# Patient Record
Sex: MALE | Race: WHITE | NOT HISPANIC OR LATINO | URBAN - METROPOLITAN AREA
[De-identification: names, ages, dates, MRNs, and addresses within clinical notes are randomized per-mention and may not be internally consistent; named-entity substitution may affect disease eponyms.]

---

## 2020-07-10 ENCOUNTER — INPATIENT (INPATIENT)
Facility: HOSPITAL | Age: 40
LOS: 0 days | Discharge: HOME | DRG: 394 | End: 2020-07-11
Attending: SURGERY | Admitting: SURGERY
Payer: COMMERCIAL

## 2020-07-10 VITALS
WEIGHT: 250 LBS | RESPIRATION RATE: 18 BRPM | TEMPERATURE: 98 F | SYSTOLIC BLOOD PRESSURE: 153 MMHG | HEIGHT: 69 IN | HEART RATE: 70 BPM | OXYGEN SATURATION: 100 % | DIASTOLIC BLOOD PRESSURE: 74 MMHG

## 2020-07-10 DIAGNOSIS — N20.1 CALCULUS OF URETER: ICD-10-CM

## 2020-07-10 LAB
ALBUMIN SERPL ELPH-MCNC: 4.1 G/DL — SIGNIFICANT CHANGE UP (ref 3.3–5)
ALP SERPL-CCNC: 62 U/L — SIGNIFICANT CHANGE UP (ref 40–120)
ALT FLD-CCNC: 13 U/L — SIGNIFICANT CHANGE UP (ref 10–45)
ANION GAP SERPL CALC-SCNC: 13 MMOL/L — SIGNIFICANT CHANGE UP (ref 5–17)
APPEARANCE UR: ABNORMAL
APTT BLD: 30.1 SEC — SIGNIFICANT CHANGE UP (ref 27.5–35.5)
AST SERPL-CCNC: 15 U/L — SIGNIFICANT CHANGE UP (ref 10–40)
BASOPHILS # BLD AUTO: 0.04 K/UL — SIGNIFICANT CHANGE UP (ref 0–0.2)
BASOPHILS NFR BLD AUTO: 0.2 % — SIGNIFICANT CHANGE UP (ref 0–2)
BILIRUB SERPL-MCNC: 1.1 MG/DL — SIGNIFICANT CHANGE UP (ref 0.2–1.2)
BILIRUB UR-MCNC: NEGATIVE — SIGNIFICANT CHANGE UP
BLD GP AB SCN SERPL QL: NEGATIVE — SIGNIFICANT CHANGE UP
BUN SERPL-MCNC: 16 MG/DL — SIGNIFICANT CHANGE UP (ref 7–23)
CALCIUM SERPL-MCNC: 8.5 MG/DL — SIGNIFICANT CHANGE UP (ref 8.4–10.5)
CHLORIDE SERPL-SCNC: 101 MMOL/L — SIGNIFICANT CHANGE UP (ref 96–108)
CO2 SERPL-SCNC: 27 MMOL/L — SIGNIFICANT CHANGE UP (ref 22–31)
COLOR SPEC: YELLOW — SIGNIFICANT CHANGE UP
CREAT SERPL-MCNC: 1.27 MG/DL — SIGNIFICANT CHANGE UP (ref 0.5–1.3)
DIFF PNL FLD: ABNORMAL
EOSINOPHIL # BLD AUTO: 0 K/UL — SIGNIFICANT CHANGE UP (ref 0–0.5)
EOSINOPHIL NFR BLD AUTO: 0 % — SIGNIFICANT CHANGE UP (ref 0–6)
GLUCOSE SERPL-MCNC: 123 MG/DL — HIGH (ref 70–99)
GLUCOSE UR QL: NEGATIVE — SIGNIFICANT CHANGE UP
HCT VFR BLD CALC: 31.7 % — LOW (ref 39–50)
HGB BLD-MCNC: 10.2 G/DL — LOW (ref 13–17)
IMM GRANULOCYTES NFR BLD AUTO: 0.4 % — SIGNIFICANT CHANGE UP (ref 0–1.5)
INR BLD: 1.09 — SIGNIFICANT CHANGE UP (ref 0.88–1.16)
KETONES UR-MCNC: NEGATIVE — SIGNIFICANT CHANGE UP
LACTATE SERPL-SCNC: 1.1 MMOL/L — SIGNIFICANT CHANGE UP (ref 0.5–2)
LEUKOCYTE ESTERASE UR-ACNC: ABNORMAL
LIDOCAIN IGE QN: 22 U/L — SIGNIFICANT CHANGE UP (ref 7–60)
LYMPHOCYTES # BLD AUTO: 13.6 % — SIGNIFICANT CHANGE UP (ref 13–44)
LYMPHOCYTES # BLD AUTO: 2.28 K/UL — SIGNIFICANT CHANGE UP (ref 1–3.3)
MCHC RBC-ENTMCNC: 28.2 PG — SIGNIFICANT CHANGE UP (ref 27–34)
MCHC RBC-ENTMCNC: 32.2 GM/DL — SIGNIFICANT CHANGE UP (ref 32–36)
MCV RBC AUTO: 87.6 FL — SIGNIFICANT CHANGE UP (ref 80–100)
MONOCYTES # BLD AUTO: 0.88 K/UL — SIGNIFICANT CHANGE UP (ref 0–0.9)
MONOCYTES NFR BLD AUTO: 5.3 % — SIGNIFICANT CHANGE UP (ref 2–14)
NEUTROPHILS # BLD AUTO: 13.47 K/UL — HIGH (ref 1.8–7.4)
NEUTROPHILS NFR BLD AUTO: 80.5 % — HIGH (ref 43–77)
NITRITE UR-MCNC: NEGATIVE — SIGNIFICANT CHANGE UP
NRBC # BLD: 0 /100 WBCS — SIGNIFICANT CHANGE UP (ref 0–0)
PH UR: 6 — SIGNIFICANT CHANGE UP (ref 5–8)
PLATELET # BLD AUTO: 305 K/UL — SIGNIFICANT CHANGE UP (ref 150–400)
POTASSIUM SERPL-MCNC: 3.8 MMOL/L — SIGNIFICANT CHANGE UP (ref 3.5–5.3)
POTASSIUM SERPL-SCNC: 3.8 MMOL/L — SIGNIFICANT CHANGE UP (ref 3.5–5.3)
PROT SERPL-MCNC: 6.4 G/DL — SIGNIFICANT CHANGE UP (ref 6–8.3)
PROT UR-MCNC: 100 MG/DL
PROTHROM AB SERPL-ACNC: 13 SEC — SIGNIFICANT CHANGE UP (ref 10.6–13.6)
RBC # BLD: 3.62 M/UL — LOW (ref 4.2–5.8)
RBC # FLD: 13.5 % — SIGNIFICANT CHANGE UP (ref 10.3–14.5)
RH IG SCN BLD-IMP: POSITIVE — SIGNIFICANT CHANGE UP
SODIUM SERPL-SCNC: 141 MMOL/L — SIGNIFICANT CHANGE UP (ref 135–145)
SP GR SPEC: 1.02 — SIGNIFICANT CHANGE UP (ref 1–1.03)
UROBILINOGEN FLD QL: 0.2 E.U./DL — SIGNIFICANT CHANGE UP
WBC # BLD: 16.74 K/UL — HIGH (ref 3.8–10.5)
WBC # FLD AUTO: 16.74 K/UL — HIGH (ref 3.8–10.5)

## 2020-07-10 PROCEDURE — 74174 CTA ABD&PLVS W/CONTRAST: CPT | Mod: 26,59

## 2020-07-10 PROCEDURE — 74018 RADEX ABDOMEN 1 VIEW: CPT | Mod: 26

## 2020-07-10 PROCEDURE — 99285 EMERGENCY DEPT VISIT HI MDM: CPT

## 2020-07-10 RX ORDER — CIPROFLOXACIN LACTATE 400MG/40ML
400 VIAL (ML) INTRAVENOUS ONCE
Refills: 0 | Status: COMPLETED | OUTPATIENT
Start: 2020-07-10 | End: 2020-07-10

## 2020-07-10 RX ORDER — RADIOPAQUE PVC MARKERS/BARIUM 24MARKERS
30 CAPSULE ORAL ONCE
Refills: 0 | Status: DISCONTINUED | OUTPATIENT
Start: 2020-07-10 | End: 2020-07-11

## 2020-07-10 RX ORDER — ONDANSETRON 8 MG/1
4 TABLET, FILM COATED ORAL ONCE
Refills: 0 | Status: COMPLETED | OUTPATIENT
Start: 2020-07-10 | End: 2020-07-10

## 2020-07-10 RX ORDER — METRONIDAZOLE 500 MG
500 TABLET ORAL ONCE
Refills: 0 | Status: DISCONTINUED | OUTPATIENT
Start: 2020-07-10 | End: 2020-07-10

## 2020-07-10 RX ORDER — HYDROMORPHONE HYDROCHLORIDE 2 MG/ML
1 INJECTION INTRAMUSCULAR; INTRAVENOUS; SUBCUTANEOUS ONCE
Refills: 0 | Status: DISCONTINUED | OUTPATIENT
Start: 2020-07-10 | End: 2020-07-10

## 2020-07-10 RX ORDER — SODIUM CHLORIDE 9 MG/ML
1000 INJECTION INTRAMUSCULAR; INTRAVENOUS; SUBCUTANEOUS ONCE
Refills: 0 | Status: COMPLETED | OUTPATIENT
Start: 2020-07-10 | End: 2020-07-10

## 2020-07-10 RX ADMIN — HYDROMORPHONE HYDROCHLORIDE 1 MILLIGRAM(S): 2 INJECTION INTRAMUSCULAR; INTRAVENOUS; SUBCUTANEOUS at 22:17

## 2020-07-10 RX ADMIN — ONDANSETRON 4 MILLIGRAM(S): 8 TABLET, FILM COATED ORAL at 22:17

## 2020-07-10 RX ADMIN — SODIUM CHLORIDE 1000 MILLILITER(S): 9 INJECTION INTRAMUSCULAR; INTRAVENOUS; SUBCUTANEOUS at 22:18

## 2020-07-10 NOTE — CONSULT NOTE ADULT - SUBJECTIVE AND OBJECTIVE BOX
PRELIM NOTE    40 y.o. M patient     PAST MEDICAL & SURGICAL HISTORY:  Ureteral stone s/p cysto, left JJ stent at OSH in [place]  2020  S/p cysto, URS, stent 2010 at [facility]    ALLERGIES:  PCN      LABS:                        10.2   16.74 )-----------( 305      ( 10 Jul 2020 22:00 )             31.7   07-10    141  |  101  |  16  ----------------------------<  123<H>  3.8   |  27  |  1.27    Ca    8.5      10 Jul 2020 22:00    TPro  6.4  /  Alb  4.1  /  TBili  1.1  /  DBili  x   /  AST  15  /  ALT  13  /  AlkPhos  62  07-10    Urinalysis Basic - ( 10 Jul 2020 22:35 )    Color: Yellow / Appearance: SL Cloudy / S.025 / pH: x  Gluc: x / Ketone: NEGATIVE  / Bili: Negative / Urobili: 0.2 E.U./dL   Blood: x / Protein: 100 mg/dL / Nitrite: NEGATIVE   Leuk Esterase: Trace / RBC: x / WBC x   Sq Epi: x / Non Sq Epi: x / Bacteria: x      IMAGING:  KUB - left JJ stent in adequate position      Vital Signs Last 24 Hrs  T(C): 36.7 (10 Jul 2020 21:37), Max: 36.7 (10 Jul 2020 21:37)  T(F): 98.1 (10 Jul 2020 21:37), Max: 98.1 (10 Jul 2020 21:37)  HR: 70 (10 Jul 2020 21:37) (70 - 70)  BP: 153/74 (10 Jul 2020 21:37) (153/74 - 153/74)  BP(mean): --  RR: 18 (10 Jul 2020 21:37) (18 - 18)  SpO2: 100% (10 Jul 2020 21:37) (100% - 100%)    PHYSICAL EXAM:  Gen:  HENT:  Card:  Resp:  Abd:  :  Ext:  Skin:  Psych: 40 y.o. M patient presenting with ER with complaint of abdominal pain and bright blood per rectum x2 weeks.  Patient states he went to a subdsidiary of [facility] in [place] for this issue, and was subsequently diagnosed with a 8mm left proximal ureteral stent and multiple bilateral kidney stones.  He is now s/p cysto, URS, left stent.  He states stone was not removed.  Patient was unhappy with his care at OSH and signed out AMA, now seeking treatment here.  Denies F/C/N/V.  Denies stent colic.  Denies hematuria.  Has mild dysuria.       history significant for multiple bilateral stones, requiring several surgical interventions over the years (cysto, URS, laser procedure, also ESWL) and some spontaneously voided.  He denies ever having PCNL.  Patient used to see Dr Rojas here at [facility].  He does not remember all the procedures done, the timing or the urologists who performed them all.    PAST MEDICAL & SURGICAL HISTORY:  Ureteral stone s/p cysto,URS, left JJ stent at OSH in [place]  2020 (presumed procedure based on patient description, need records to confirm)  S/p cysto, URS, stent 2010 at [facility] with Dr Rojas  S/P multiple renal stone procedures (cysto/stent and ESWLs) - patient does not remember timing or urologist who performed these  Hemorrhoids s/p banding    ALLERGIES:  PCN      LABS:                        10.2   16.74 )-----------( 305      ( 10 Jul 2020 22:00 )             31.7   07-10    141  |  101  |  16  ----------------------------<  123<H>  3.8   |  27  |  1.27    Ca    8.5      10 Jul 2020 22:00    TPro  6.4  /  Alb  4.1  /  TBili  1.1  /  DBili  x   /  AST  15  /  ALT  13  /  AlkPhos  62  07-10    Urinalysis Basic - ( 10 Jul 2020 22:35 )    Color: Yellow / Appearance: SL Cloudy / S.025 / pH: x  Gluc: x / Ketone: NEGATIVE  / Bili: Negative / Urobili: 0.2 E.U./dL   Blood: x / Protein: 100 mg/dL / Nitrite: NEGATIVE   Leuk Esterase: Trace / RBC: x / WBC x   Sq Epi: x / Non Sq Epi: x / Bacteria: x      IMAGING:  KUB - left JJ stent in adequate position      Vital Signs Last 24 Hrs  T(C): 36.7 (10 Jul 2020 21:37), Max: 36.7 (10 Jul 2020 21:37)  T(F): 98.1 (10 Jul 2020 21:37), Max: 98.1 (10 Jul 2020 21:37)  HR: 70 (10 Jul 2020 21:37) (70 - 70)  BP: 153/74 (10 Jul 2020 21:37) (153/74 - 153/74)  BP(mean): --  RR: 18 (10 Jul 2020 21:37) (18 - 18)  SpO2: 100% (10 Jul 2020 21:37) (100% - 100%)    PHYSICAL EXAM:  Gen: NAD, resting in bed, conversant  HENT: NC/T, PERRL, neck flat, good ROM  Resp: no stridor or wheezing, good inspiratory effort  Abd: soft, no guarding or rigidity, no suprapubic tenderness  : voiding, mild L CVAT  LUCY: performed by surgical resident  Ext: b/l warm, well perfused  Skin: no obvious rashes, lesions, ulcers  Psych: awake, alert, oriented to person, place, time 40 y.o. M patient presenting with ER with complaint of abdominal pain and bright blood per rectum x2 weeks.  Patient states he went to a subdsidiary of [facility] in [place] for this issue, and was subsequently diagnosed with a 8mm left proximal ureteral stent and multiple bilateral kidney stones.  He is now s/p cysto, URS, left stent.  He states stone was not removed.  Patient was unhappy with his care at OSH and signed out AMA, now seeking treatment here.  Denies F/C/N/V.  Denies stent colic.  Denies hematuria.  Has mild dysuria.       history significant for multiple bilateral stones, requiring several surgical interventions over the years (cysto, URS, laser procedure, also ESWL) and some spontaneously voided.  He denies ever having PCNL.  Patient used to see Dr Rojas here at [facility].  He does not remember all the procedures done, the timing or the urologists who performed them all.    PAST MEDICAL & SURGICAL HISTORY:  Ureteral stone s/p cysto,URS, left JJ stent at OSH in [place]  2020 (presumed procedure based on patient description, need records to confirm)  S/p cysto, URS, stent 2010 at [facility] with Dr Rojas  S/P multiple renal stone procedures (cysto/stent and ESWLs) - patient does not remember timing or urologist who performed these  Hemorrhoids s/p banding    ALLERGIES:  PCN      LABS:                        10.2   16.74 )-----------( 305      ( 10 Jul 2020 22:00 )             31.7   07-10    141  |  101  |  16  ----------------------------<  123<H>  3.8   |  27  |  1.27    Ca    8.5      10 Jul 2020 22:00    TPro  6.4  /  Alb  4.1  /  TBili  1.1  /  DBili  x   /  AST  15  /  ALT  13  /  AlkPhos  62  07-10    Urinalysis Basic - ( 10 Jul 2020 22:35 )    Color: Yellow / Appearance: SL Cloudy / S.025 / pH: x  Gluc: x / Ketone: NEGATIVE  / Bili: Negative / Urobili: 0.2 E.U./dL   Blood: x / Protein: 100 mg/dL / Nitrite: NEGATIVE   Leuk Esterase: Trace / RBC: x / WBC x   Sq Epi: x / Non Sq Epi: x / Bacteria: x      IMAGING:  KUB - left JJ stent in adequate position      Vital Signs Last 24 Hrs  T(C): 36.7 (10 Jul 2020 21:37), Max: 36.7 (10 Jul 2020 21:37)  T(F): 98.1 (10 Jul 2020 21:37), Max: 98.1 (10 Jul 2020 21:37)  HR: 70 (10 Jul 2020 21:37) (70 - 70)  BP: 153/74 (10 Jul 2020 21:37) (153/74 - 153/74)  BP(mean): --  RR: 18 (10 Jul 2020 21:37) (18 - 18)  SpO2: 100% (10 Jul 2020 21:37) (100% - 100%)    PHYSICAL EXAM:  Gen: NAD, resting in bed, conversant  HENT: NC/T, PERRL, neck flat, good ROM  Resp: no stridor or wheezing, good inspiratory effort  Abd: soft, no guarding or rigidity, no suprapubic tenderness  : voiding,  L CVAT  LUCY: performed by surgical resident  Ext: b/l warm, well perfused  Skin: no obvious rashes, lesions, ulcers  Psych: awake, alert, oriented to person, place, time 40 y.o. M patient presenting with ER with complaint of abdominal pain and bright blood per rectum x2 weeks.  Patient states he went to a subdsidiary of [facility] in [place] for this issue, and was subsequently diagnosed with a 8mm left proximal ureteral stent and multiple bilateral kidney stones.  He is now s/p cysto, URS, left stent.  He states stone was not removed.  Patient was unhappy with his care at OSH and signed out AMA, now seeking treatment here.  Denies F/C/N/V.  Denies stent colic.  Denies hematuria.  Has mild dysuria.       history significant for multiple bilateral stones, requiring several surgical interventions over the years (cysto, URS, laser procedure, also ESWL) and some spontaneously voided.  He denies ever having PCNL.  Patient used to see Dr Rojas here at [facility].  He does not remember all the procedures done, the timing or the urologists who performed them all.    PAST MEDICAL & SURGICAL HISTORY:  Ureteral stone s/p cysto,URS, left JJ stent at OSH in [place]  2020 (presumed procedure based on patient description, need records to confirm)  S/p cysto, URS, stent 2010 at [facility] with Dr Rojas  S/P multiple renal stone procedures (cysto/stent and ESWLs) - patient does not remember timing or urologist who performed these  Hemorrhoids s/p banding    ALLERGIES:  PCN      LABS:                        10.2   16.74 )-----------( 305      ( 10 Jul 2020 22:00 )             31.7   07-10    141  |  101  |  16  ----------------------------<  123<H>  3.8   |  27  |  1.27    Ca    8.5      10 Jul 2020 22:00    TPro  6.4  /  Alb  4.1  /  TBili  1.1  /  DBili  x   /  AST  15  /  ALT  13  /  AlkPhos  62  07-10    Urinalysis Basic - ( 10 Jul 2020 22:35 )    Color: Yellow / Appearance: SL Cloudy / S.025 / pH: x  Gluc: x / Ketone: NEGATIVE  / Bili: Negative / Urobili: 0.2 E.U./dL   Blood: x / Protein: 100 mg/dL / Nitrite: NEGATIVE   Leuk Esterase: Trace / RBC: Many /HPF / WBC 5-10 /HPF   Sq Epi: x / Non Sq Epi: 0-5 /HPF / Bacteria: Present /HPF      IMAGING:  KUB - left JJ stent in adequate position      Vital Signs Last 24 Hrs  T(C): 36.7 (10 Jul 2020 21:37), Max: 36.7 (10 Jul 2020 21:37)  T(F): 98.1 (10 Jul 2020 21:37), Max: 98.1 (10 Jul 2020 21:37)  HR: 70 (10 Jul 2020 21:37) (70 - 70)  BP: 153/74 (10 Jul 2020 21:37) (153/74 - 153/74)  BP(mean): --  RR: 18 (10 Jul 2020 21:37) (18 - 18)  SpO2: 100% (10 Jul 2020 21:37) (100% - 100%)    PHYSICAL EXAM:  Gen: NAD, resting in bed, conversant  HENT: NC/T, PERRL, neck flat, good ROM  Resp: no stridor or wheezing, good inspiratory effort  Abd: soft, no guarding or rigidity, no suprapubic tenderness  : voiding,  L CVAT  LUCY: performed by surgical resident  Ext: b/l warm, well perfused  Skin: no obvious rashes, lesions, ulcers  Psych: awake, alert, oriented to person, place, time 40 y.o. M patient presenting with ER with complaint of abdominal pain and bright blood per rectum x2 weeks.  He is s/p hemorrhoid banding 2 weeks ago.  Patient states he went to a subdsidiary of [facility] in [place] for this issue, and was subsequently diagnosed with a 8mm left proximal ureteral stent and multiple bilateral kidney stones.  He is now s/p cysto, URS, left stent.  He states stone was not removed.  Patient was unhappy with his care at OSH and signed out AMA, now seeking treatment here.  Denies F/C/N/V.  Denies stent colic.  Denies hematuria.  Has mild dysuria.       history significant for multiple bilateral stones, requiring several surgical interventions over the years (cysto, URS, laser procedure, also ESWL) and some spontaneously voided.  He denies ever having PCNL.  Patient used to see Dr Rojas here at [facility].  He does not remember all the procedures done, the timing or the urologists who performed them all.    PAST MEDICAL & SURGICAL HISTORY:  -Colonoscopy 7/10/2020  -Hemorrhoids s/p banding 2 week ago  -Ureteral stone s/p cysto,URS, left JJ stent at OSH in [place]  2020 (presumed procedure based on patient description, need records to confirm)  -S/p cysto, URS, stent 2010 at [facility] with Dr Rojas  -S/P multiple renal stone procedures (cysto/stent and ESWLs) - patient does not remember timing or urologist who performed these  -Hypertension  -Hyperlipidemia    ALLERGIES:  PCN      LABS:                        10.2   16.74 )-----------( 305      ( 10 Jul 2020 22:00 )             31.7   07-10    141  |  101  |  16  ----------------------------<  123<H>  3.8   |  27  |  1.27    Ca    8.5      10 Jul 2020 22:00    TPro  6.4  /  Alb  4.1  /  TBili  1.1  /  DBili  x   /  AST  15  /  ALT  13  /  AlkPhos  62  07-10    Urinalysis Basic - ( 10 Jul 2020 22:35 )    Color: Yellow / Appearance: SL Cloudy / S.025 / pH: x  Gluc: x / Ketone: NEGATIVE  / Bili: Negative / Urobili: 0.2 E.U./dL   Blood: x / Protein: 100 mg/dL / Nitrite: NEGATIVE   Leuk Esterase: Trace / RBC: Many /HPF / WBC 5-10 /HPF   Sq Epi: x / Non Sq Epi: 0-5 /HPF / Bacteria: Present /HPF      IMAGING:  KUB - left JJ stent in adequate position      Vital Signs Last 24 Hrs  T(C): 36.7 (10 Jul 2020 21:37), Max: 36.7 (10 Jul 2020 21:37)  T(F): 98.1 (10 Jul 2020 21:37), Max: 98.1 (10 Jul 2020 21:37)  HR: 70 (10 Jul 2020 21:37) (70 - 70)  BP: 153/74 (10 Jul 2020 21:37) (153/74 - 153/74)  BP(mean): --  RR: 18 (10 Jul 2020 21:37) (18 - 18)  SpO2: 100% (10 Jul 2020 21:37) (100% - 100%)    PHYSICAL EXAM:  Gen: NAD, resting in bed, conversant  HENT: NC/T, PERRL, neck flat, good ROM  Resp: no stridor or wheezing, good inspiratory effort  Abd: soft, no guarding or rigidity, no suprapubic tenderness  : voiding,  L CVAT  LUCY: performed by surgical resident  Ext: b/l warm, well perfused  Skin: no obvious rashes, lesions, ulcers  Psych: awake, alert, oriented to person, place, time

## 2020-07-10 NOTE — CONSULT NOTE ADULT - ASSESSMENT
40 y.o. M patient with      KUB showing Left JJ stent to be in adequate position. 40 y.o. M patient with concern for GI bleeding, CT pending.  Also recent history of left 8mm ureteral stone s/p cysto, URS, stent at OSH yesterday.    KUB showing Left JJ stent to be in adequate position.  WBC 16.7  Cre 1.27 40 y.o. M patient with concern for GI bleeding, CT pending.  Also recent history of left 8mm ureteral stone s/p cysto, URS, stent at OSH yesterday.    KUB showing Left JJ stent to be in adequate position.  WBC 16.7,  Cre 1.27, H&H 10.2/31.7,  Ua trace LE, large blood, 5-10wbc, bacteria present.

## 2020-07-10 NOTE — CONSULT NOTE ADULT - PROBLEM SELECTOR RECOMMENDATION 9
-No acute surgical intervention,  will follow  -Please order Urine culture  -F/U Blood culture  -Pain control  -If patient develops stent colic pain(which usually presents as spasms to left flank or pain that radiates up flank when voiding), can give Ditropan 5mg TID prn or Valium 5mg TID prn -Discussed with Dr Lugo, No acute surgical intervention,  will follow  -Please order Urine culture  -F/U Blood culture  -Pain control  -If patient develops stent colic pain(which usually presents as spasms to left flank or pain that radiates up flank when voiding), can give Ditropan 5mg TID prn or Valium 5mg TID prn -Discussed with Dr Lugo, No acute surgical intervention,  will follow  -Please order Urine culture  -F/U Blood culture  -Pain control  -If patient develops stent colic pain(which usually presents as spasms to left flank or pain that radiates up flank when voiding), can give Ditropan 5mg TID prn or Valium 5mg TID prn  -IV fluid hydration  -Pyridium 100mg TID prn for dysuria for max 2 days -Discussed with Dr Lugo, No acute surgical intervention,  will follow  -Please order Urine culture  -F/U Blood culture  -Pain control  -If patient develops stent colic pain(which usually presents as spasms to left flank or pain that radiates up flank when voiding), can give Ditropan 5mg TID prn or Valium 5mg TID prn  -IV fluid hydration  -Pyridium 100mg TID prn for dysuria for max 2 days  -Antibiotic choice will depend on results of the rest of his work up -Discussed with Dr Lugo, No acute surgical intervention,  will follow  -Please order Urine culture  -F/U Blood culture  -Pain control  -If patient develops stent colic pain(which usually presents as spasms to left flank or pain that radiates up flank when voiding), can give Ditropan 5mg TID prn or Valium 5mg TID prn  -IV fluid hydration  -Pyridium 100mg TID prn for dysuria for max 2 days  -Antibiotic - IV Cipro ordered by ER

## 2020-07-10 NOTE — ED PROVIDER NOTE - CLINICAL SUMMARY MEDICAL DECISION MAKING FREE TEXT BOX
39 yo male with  diffuse abdominal pain and rectal bleeding. Symptoms started 2 weeks ago. Pt also mentioned that he had band ligation done for his internal hemorrhoids 2 weeks ago and colonoscopy earlier today. had ureteral stent placed for his left side ureteral stone yesterday. Pt afebrile, but in significant amount of pain on exam. no active  rectal bleeding while in the ER. labs and CT scan done. pt evaluated by urology and surgery team. admission recommended for further management.

## 2020-07-10 NOTE — ED ADULT TRIAGE NOTE - CHIEF COMPLAINT QUOTE
abdominal pain, pt left AMA from [facility] @5pm today was admitted there for 2 days hx of kidney stone, last pain medication Dilaudid @5pm

## 2020-07-10 NOTE — ED PROVIDER NOTE - OBJECTIVE STATEMENT
39 yo male with h/o HLD, HTN, in the ER c/o rectal bleeding x 2 weeks, c/o associated abdominal and lower back/flank pain. Pt mentioned he had band placed to treat his hemorrhoids  2 weeks ago and since that he  has been having RBPR. Pt reports  he fainted 2 days ago after massive bleeding and abdominal pain. pt went to the nearest hospital where he was diagnosed with 8 mm left ureteral stone and had stent placed for it yesterday. Pt also had colonoscopy done earlier today. At present c/o diffuse abdominal pain, nausea, also reports that rectal bleeding continues.

## 2020-07-10 NOTE — ED ADULT NURSE NOTE - CHIEF COMPLAINT QUOTE
abdominal pain, pt left AMA from [facility] @5pm today was admitted there for 2 days hx of kidney stone, last pain medication Dilaudid @5pm

## 2020-07-11 VITALS
OXYGEN SATURATION: 96 % | DIASTOLIC BLOOD PRESSURE: 80 MMHG | HEART RATE: 60 BPM | RESPIRATION RATE: 16 BRPM | SYSTOLIC BLOOD PRESSURE: 145 MMHG | TEMPERATURE: 98 F

## 2020-07-11 LAB
ANION GAP SERPL CALC-SCNC: 11 MMOL/L — SIGNIFICANT CHANGE UP (ref 5–17)
ANION GAP SERPL CALC-SCNC: 12 MMOL/L — SIGNIFICANT CHANGE UP (ref 5–17)
ANION GAP SERPL CALC-SCNC: 12 MMOL/L — SIGNIFICANT CHANGE UP (ref 5–17)
BASOPHILS # BLD AUTO: 0.04 K/UL — SIGNIFICANT CHANGE UP (ref 0–0.2)
BASOPHILS NFR BLD AUTO: 0.3 % — SIGNIFICANT CHANGE UP (ref 0–2)
BUN SERPL-MCNC: 12 MG/DL — SIGNIFICANT CHANGE UP (ref 7–23)
BUN SERPL-MCNC: 13 MG/DL — SIGNIFICANT CHANGE UP (ref 7–23)
BUN SERPL-MCNC: 15 MG/DL — SIGNIFICANT CHANGE UP (ref 7–23)
CALCIUM SERPL-MCNC: 8.2 MG/DL — LOW (ref 8.4–10.5)
CALCIUM SERPL-MCNC: 8.2 MG/DL — LOW (ref 8.4–10.5)
CALCIUM SERPL-MCNC: 8.3 MG/DL — LOW (ref 8.4–10.5)
CHLORIDE SERPL-SCNC: 101 MMOL/L — SIGNIFICANT CHANGE UP (ref 96–108)
CHLORIDE SERPL-SCNC: 101 MMOL/L — SIGNIFICANT CHANGE UP (ref 96–108)
CHLORIDE SERPL-SCNC: 102 MMOL/L — SIGNIFICANT CHANGE UP (ref 96–108)
CO2 SERPL-SCNC: 28 MMOL/L — SIGNIFICANT CHANGE UP (ref 22–31)
CO2 SERPL-SCNC: 28 MMOL/L — SIGNIFICANT CHANGE UP (ref 22–31)
CO2 SERPL-SCNC: 29 MMOL/L — SIGNIFICANT CHANGE UP (ref 22–31)
CREAT SERPL-MCNC: 1.25 MG/DL — SIGNIFICANT CHANGE UP (ref 0.5–1.3)
CREAT SERPL-MCNC: 1.26 MG/DL — SIGNIFICANT CHANGE UP (ref 0.5–1.3)
CREAT SERPL-MCNC: 1.29 MG/DL — SIGNIFICANT CHANGE UP (ref 0.5–1.3)
EOSINOPHIL # BLD AUTO: 0 K/UL — SIGNIFICANT CHANGE UP (ref 0–0.5)
EOSINOPHIL NFR BLD AUTO: 0 % — SIGNIFICANT CHANGE UP (ref 0–6)
GLUCOSE SERPL-MCNC: 102 MG/DL — HIGH (ref 70–99)
GLUCOSE SERPL-MCNC: 103 MG/DL — HIGH (ref 70–99)
GLUCOSE SERPL-MCNC: 107 MG/DL — HIGH (ref 70–99)
HCT VFR BLD CALC: 27.8 % — LOW (ref 39–50)
HCT VFR BLD CALC: 27.9 % — LOW (ref 39–50)
HCT VFR BLD CALC: 29.6 % — LOW (ref 39–50)
HGB BLD-MCNC: 8.8 G/DL — LOW (ref 13–17)
HGB BLD-MCNC: 8.9 G/DL — LOW (ref 13–17)
HGB BLD-MCNC: 9.4 G/DL — LOW (ref 13–17)
IMM GRANULOCYTES NFR BLD AUTO: 0.4 % — SIGNIFICANT CHANGE UP (ref 0–1.5)
LYMPHOCYTES # BLD AUTO: 19.9 % — SIGNIFICANT CHANGE UP (ref 13–44)
LYMPHOCYTES # BLD AUTO: 2.34 K/UL — SIGNIFICANT CHANGE UP (ref 1–3.3)
MAGNESIUM SERPL-MCNC: 2.1 MG/DL — SIGNIFICANT CHANGE UP (ref 1.6–2.6)
MAGNESIUM SERPL-MCNC: 2.2 MG/DL — SIGNIFICANT CHANGE UP (ref 1.6–2.6)
MCHC RBC-ENTMCNC: 28 PG — SIGNIFICANT CHANGE UP (ref 27–34)
MCHC RBC-ENTMCNC: 28.3 PG — SIGNIFICANT CHANGE UP (ref 27–34)
MCHC RBC-ENTMCNC: 28.5 PG — SIGNIFICANT CHANGE UP (ref 27–34)
MCHC RBC-ENTMCNC: 31.7 GM/DL — LOW (ref 32–36)
MCHC RBC-ENTMCNC: 31.8 GM/DL — LOW (ref 32–36)
MCHC RBC-ENTMCNC: 31.9 GM/DL — LOW (ref 32–36)
MCV RBC AUTO: 87.7 FL — SIGNIFICANT CHANGE UP (ref 80–100)
MCV RBC AUTO: 89.4 FL — SIGNIFICANT CHANGE UP (ref 80–100)
MCV RBC AUTO: 89.7 FL — SIGNIFICANT CHANGE UP (ref 80–100)
MONOCYTES # BLD AUTO: 0.82 K/UL — SIGNIFICANT CHANGE UP (ref 0–0.9)
MONOCYTES NFR BLD AUTO: 7 % — SIGNIFICANT CHANGE UP (ref 2–14)
NEUTROPHILS # BLD AUTO: 8.52 K/UL — HIGH (ref 1.8–7.4)
NEUTROPHILS NFR BLD AUTO: 72.4 % — SIGNIFICANT CHANGE UP (ref 43–77)
NRBC # BLD: 0 /100 WBCS — SIGNIFICANT CHANGE UP (ref 0–0)
PHOSPHATE SERPL-MCNC: 2.7 MG/DL — SIGNIFICANT CHANGE UP (ref 2.5–4.5)
PHOSPHATE SERPL-MCNC: 3.1 MG/DL — SIGNIFICANT CHANGE UP (ref 2.5–4.5)
PLATELET # BLD AUTO: 238 K/UL — SIGNIFICANT CHANGE UP (ref 150–400)
PLATELET # BLD AUTO: 247 K/UL — SIGNIFICANT CHANGE UP (ref 150–400)
PLATELET # BLD AUTO: 286 K/UL — SIGNIFICANT CHANGE UP (ref 150–400)
POTASSIUM SERPL-MCNC: 3.3 MMOL/L — LOW (ref 3.5–5.3)
POTASSIUM SERPL-MCNC: 3.6 MMOL/L — SIGNIFICANT CHANGE UP (ref 3.5–5.3)
POTASSIUM SERPL-MCNC: 3.8 MMOL/L — SIGNIFICANT CHANGE UP (ref 3.5–5.3)
POTASSIUM SERPL-SCNC: 3.3 MMOL/L — LOW (ref 3.5–5.3)
POTASSIUM SERPL-SCNC: 3.6 MMOL/L — SIGNIFICANT CHANGE UP (ref 3.5–5.3)
POTASSIUM SERPL-SCNC: 3.8 MMOL/L — SIGNIFICANT CHANGE UP (ref 3.5–5.3)
RBC # BLD: 3.11 M/UL — LOW (ref 4.2–5.8)
RBC # BLD: 3.18 M/UL — LOW (ref 4.2–5.8)
RBC # BLD: 3.3 M/UL — LOW (ref 4.2–5.8)
RBC # FLD: 13.7 % — SIGNIFICANT CHANGE UP (ref 10.3–14.5)
RBC # FLD: 13.8 % — SIGNIFICANT CHANGE UP (ref 10.3–14.5)
RBC # FLD: 13.9 % — SIGNIFICANT CHANGE UP (ref 10.3–14.5)
SARS-COV-2 RNA SPEC QL NAA+PROBE: SIGNIFICANT CHANGE UP
SODIUM SERPL-SCNC: 140 MMOL/L — SIGNIFICANT CHANGE UP (ref 135–145)
SODIUM SERPL-SCNC: 142 MMOL/L — SIGNIFICANT CHANGE UP (ref 135–145)
SODIUM SERPL-SCNC: 142 MMOL/L — SIGNIFICANT CHANGE UP (ref 135–145)
WBC # BLD: 11.77 K/UL — HIGH (ref 3.8–10.5)
WBC # BLD: 8.7 K/UL — SIGNIFICANT CHANGE UP (ref 3.8–10.5)
WBC # BLD: 9.21 K/UL — SIGNIFICANT CHANGE UP (ref 3.8–10.5)
WBC # FLD AUTO: 11.77 K/UL — HIGH (ref 3.8–10.5)
WBC # FLD AUTO: 8.7 K/UL — SIGNIFICANT CHANGE UP (ref 3.8–10.5)
WBC # FLD AUTO: 9.21 K/UL — SIGNIFICANT CHANGE UP (ref 3.8–10.5)

## 2020-07-11 PROCEDURE — 96374 THER/PROPH/DIAG INJ IV PUSH: CPT | Mod: XU

## 2020-07-11 PROCEDURE — 74018 RADEX ABDOMEN 1 VIEW: CPT

## 2020-07-11 PROCEDURE — 96375 TX/PRO/DX INJ NEW DRUG ADDON: CPT

## 2020-07-11 PROCEDURE — 86850 RBC ANTIBODY SCREEN: CPT

## 2020-07-11 PROCEDURE — 80048 BASIC METABOLIC PNL TOTAL CA: CPT

## 2020-07-11 PROCEDURE — 85027 COMPLETE CBC AUTOMATED: CPT

## 2020-07-11 PROCEDURE — 86901 BLOOD TYPING SEROLOGIC RH(D): CPT

## 2020-07-11 PROCEDURE — 99253 IP/OBS CNSLTJ NEW/EST LOW 45: CPT | Mod: GC

## 2020-07-11 PROCEDURE — 87086 URINE CULTURE/COLONY COUNT: CPT

## 2020-07-11 PROCEDURE — 74174 CTA ABD&PLVS W/CONTRAST: CPT

## 2020-07-11 PROCEDURE — 83735 ASSAY OF MAGNESIUM: CPT

## 2020-07-11 PROCEDURE — 87040 BLOOD CULTURE FOR BACTERIA: CPT

## 2020-07-11 PROCEDURE — 81001 URINALYSIS AUTO W/SCOPE: CPT

## 2020-07-11 PROCEDURE — 83690 ASSAY OF LIPASE: CPT

## 2020-07-11 PROCEDURE — 85025 COMPLETE CBC W/AUTO DIFF WBC: CPT

## 2020-07-11 PROCEDURE — 83605 ASSAY OF LACTIC ACID: CPT

## 2020-07-11 PROCEDURE — 85730 THROMBOPLASTIN TIME PARTIAL: CPT

## 2020-07-11 PROCEDURE — 84100 ASSAY OF PHOSPHORUS: CPT

## 2020-07-11 PROCEDURE — U0003: CPT

## 2020-07-11 PROCEDURE — 36415 COLL VENOUS BLD VENIPUNCTURE: CPT

## 2020-07-11 PROCEDURE — 99285 EMERGENCY DEPT VISIT HI MDM: CPT | Mod: 25

## 2020-07-11 PROCEDURE — 85610 PROTHROMBIN TIME: CPT

## 2020-07-11 PROCEDURE — 80053 COMPREHEN METABOLIC PANEL: CPT

## 2020-07-11 RX ORDER — ONDANSETRON 8 MG/1
4 TABLET, FILM COATED ORAL EVERY 6 HOURS
Refills: 0 | Status: DISCONTINUED | OUTPATIENT
Start: 2020-07-11 | End: 2020-07-11

## 2020-07-11 RX ORDER — SODIUM CHLORIDE 9 MG/ML
1000 INJECTION, SOLUTION INTRAVENOUS
Refills: 0 | Status: DISCONTINUED | OUTPATIENT
Start: 2020-07-11 | End: 2020-07-11

## 2020-07-11 RX ORDER — LOSARTAN/HYDROCHLOROTHIAZIDE 100MG-25MG
1 TABLET ORAL
Qty: 0 | Refills: 0 | DISCHARGE

## 2020-07-11 RX ORDER — POTASSIUM CHLORIDE 20 MEQ
40 PACKET (EA) ORAL EVERY 4 HOURS
Refills: 0 | Status: COMPLETED | OUTPATIENT
Start: 2020-07-11 | End: 2020-07-11

## 2020-07-11 RX ORDER — MOXIFLOXACIN HYDROCHLORIDE TABLETS, 400 MG 400 MG/1
1 TABLET, FILM COATED ORAL
Qty: 7 | Refills: 0
Start: 2020-07-11 | End: 2020-07-17

## 2020-07-11 RX ORDER — PHENAZOPYRIDINE HCL 100 MG
2 TABLET ORAL
Qty: 12 | Refills: 0
Start: 2020-07-11 | End: 2020-07-12

## 2020-07-11 RX ORDER — ROSUVASTATIN CALCIUM 5 MG/1
0 TABLET ORAL
Qty: 0 | Refills: 0 | DISCHARGE

## 2020-07-11 RX ORDER — ACETAMINOPHEN 500 MG
1000 TABLET ORAL ONCE
Refills: 0 | Status: COMPLETED | OUTPATIENT
Start: 2020-07-11 | End: 2020-07-11

## 2020-07-11 RX ORDER — CIPROFLOXACIN LACTATE 400MG/40ML
400 VIAL (ML) INTRAVENOUS EVERY 12 HOURS
Refills: 0 | Status: DISCONTINUED | OUTPATIENT
Start: 2020-07-11 | End: 2020-07-11

## 2020-07-11 RX ORDER — TAMSULOSIN HYDROCHLORIDE 0.4 MG/1
1 CAPSULE ORAL
Qty: 14 | Refills: 0
Start: 2020-07-11 | End: 2020-07-24

## 2020-07-11 RX ORDER — PHENAZOPYRIDINE HCL 100 MG
2 TABLET ORAL
Qty: 18 | Refills: 0
Start: 2020-07-11 | End: 2020-07-13

## 2020-07-11 RX ADMIN — Medication 400 MILLIGRAM(S): at 02:03

## 2020-07-11 RX ADMIN — Medication 40 MILLIEQUIVALENT(S): at 19:10

## 2020-07-11 RX ADMIN — Medication 200 MILLIGRAM(S): at 01:01

## 2020-07-11 RX ADMIN — Medication 200 MILLIGRAM(S): at 13:56

## 2020-07-11 RX ADMIN — SODIUM CHLORIDE 150 MILLILITER(S): 9 INJECTION, SOLUTION INTRAVENOUS at 02:17

## 2020-07-11 RX ADMIN — Medication 40 MILLIEQUIVALENT(S): at 16:10

## 2020-07-11 NOTE — DISCHARGE NOTE NURSING/CASE MANAGEMENT/SOCIAL WORK - PATIENT PORTAL LINK FT
You can access the FollowMyHealth Patient Portal offered by [facility] by registering at the following website: [email or web address]. By joining [facility]’s FollowMyHealth portal, you will also be able to view your health information using other applications (apps) compatible with our system.

## 2020-07-11 NOTE — DISCHARGE NOTE PROVIDER - CARE PROVIDERS DIRECT ADDRESSES
[email or web address]

## 2020-07-11 NOTE — H&P ADULT - ATTENDING COMMENTS
Doing ok.  No signs or symptoms of active bleeding.  Acute blood loss anemia.  Limited Colonoscopy (till mid transverse colon) done yesterday at outside institution showed blood in transverse colon, descending and sigmoid colon.  Unlikely hemorrhoidal bleeding based on colonoscopy findings.  Abdomen is soft, NT, ND.  Urine is clearing up.  Afebrile.  No tachycardia or hypotension.  Check H&H later this afternoon. If unchanged, start clear diet.  Urology and GI opinion noted.  Will need full colonoscopy as outpatient.  Stone extraction as outpatient.

## 2020-07-11 NOTE — H&P ADULT - HISTORY OF PRESENT ILLNESS
40M w/ PHMx of nephrolithiasis, HTN, HLD, presents with BRBPR for 3 days. Patient reports that he underwent a rubber band ligation of internal hemorrhoids on 6/16. He experienced some rectal bleeding post-op but it eventually resolved on its own. 3 days ago, the patient was and work and had a large volume BRBPR that persisted over the next couple of days. Yesterday, after having a bloody BM, the patient reports that he felt lightheaded and fainted. He went to a hospital in [place] where they performed a CT which showed no active bleed. It did however show a L stone in the proximal ureter, for which he underwent cystoscopy with stent placement. He went for a colonoscopy this AM at the OSH which showed diverticulosis throughout the left colon with some residual clots but no active bleed. The patient did not believe he was receiving adequate care so he left AMA and came to [facility]. Patient reports some abdominal pain after the colonoscopy this morning (mild, generalized, intermittent) but otherwise no CP, SOB, dizziness, lightheadedness, dysuria.     PMH: HTN, HLD, nephrolithiasis   PSH: cystoscopy w/ stent   Meds: losartan/HCTZ, rosuvastatin  All: PCN as a child, doesn't know the reaction  FH: noncontributory  SH: smokes 1ppd, drinks 1 alcoholic beverage per night, occasional marijuana use

## 2020-07-11 NOTE — DISCHARGE NOTE PROVIDER - PROVIDER TOKENS
PROVIDER:[TOKEN:[[id number]]],PROVIDER:[TOKEN:[[id number]]],PROVIDER:[TOKEN:[[id number]]]

## 2020-07-11 NOTE — CONSULT NOTE ADULT - ASSESSMENT
41yo M w/nephrolithiasis s/p L ureteral stenting, HTN, HLD, recent episodes of BRBPR s/p band ligation w/subsequent hematochezia and syncopal episode, s/p colonoscopy yesterday without bleeding source at [facility] in [place], presenting with continued BRBPR and abdominal pain.    1. Normocytic anemia - Likely related to lower GI bleeding from previous band ligation of internal hemorrhoids. Patient with syncopal episode prior to hospitalization at [facility], underwent colonoscopy with clots in the rectum and without site of bleeding identified. Patient states he presented to [facility] for continued pain post-procedurally. CTA with some distention and no evidence of active hemorrhage. Feeling better this morning after large brown BM without hematochezia or passage of clots. Anemia confounded by ongoing hematuria s/p ureteral stenting.  - Etiology of abdominal pain and distention seen on CT likely secondary to colonoscopy yesterday  - If able, please obtain records from OSH  - Etiology of bleeding likely secondary to recent band ligation of hemorrhoids, appears to have stopped at this time  - Monitor BMs for any further signs of bleeding  - Maintain active T&S, large bore IV access  - Transfusion threshold per primary team    Recommendations discussed with primary team  Case discussed with attending physician

## 2020-07-11 NOTE — DISCHARGE NOTE NURSING/CASE MANAGEMENT/SOCIAL WORK - NSDCFUADDAPPT_GEN_ALL_CORE_FT
Please call Dr. Mario's office to make a follow up appointment in 1-2 weeks.    Please call Dr. Rios's office to make an outpatient follow up for a colonoscopy [phone]    Please call Dr. Riley's office for elective ureteroscopy in 1-2 weeks [phone].

## 2020-07-11 NOTE — DISCHARGE NOTE PROVIDER - CARE PROVIDER_API CALL
Micah Mario  SURGERY  [address]  [place] [address]  Phone: [phone]  Fax: [phone]  Follow Up Time:     Brice Rios  GASTROENTEROLOGY  [address], [address]  [place] [address]  Phone: [phone]  Fax: [phone]  Follow Up Time:     Toribio Riley  UROLOGY  [address] [place] [address]  Phone: [phone]  Fax: [phone]  Follow Up Time:

## 2020-07-11 NOTE — DISCHARGE NOTE PROVIDER - NSDCCPGOAL_GEN_ALL_CORE_FT
1. We will start you on Keppra 500mg twice daily. This is to help calm the brain and reduce the frequency of speech and concentration difficulty.  2. We will schedule you for an appt with a neuro-oncologist, Dr. Dorsey, who can assume your neurological care and can coordinate with your primary oncology team.   
To get better and follow your care plan as instructed.

## 2020-07-11 NOTE — H&P ADULT - NSHPPHYSICALEXAM_GEN_ALL_CORE
Vital Signs Last 24 Hrs  T(C): 36.6 (11 Jul 2020 00:29), Max: 36.7 (10 Jul 2020 21:37)  T(F): 97.8 (11 Jul 2020 00:29), Max: 98.1 (10 Jul 2020 21:37)  HR: 82 (11 Jul 2020 00:29) (70 - 82)  BP: 138/70 (11 Jul 2020 00:29) (138/70 - 153/74)  BP(mean): --  RR: 18 (11 Jul 2020 00:29) (18 - 18)  SpO2: 97% (11 Jul 2020 00:29) (97% - 100%)    Physical Exam:  General: NAD, resting comfortably in bed  Pulmonary: Nonlabored breathing, no respiratory distress  Abdominal: soft, nondistended, nontender with no rebound or guarding, no CVA tenderness  Rectal: no palpable masses or fluctuance, no blood on finger after withdrawal   Extremities: WWP  Neuro: A/O x 3

## 2020-07-11 NOTE — DISCHARGE NOTE PROVIDER - NSDCFUADDAPPT_GEN_ALL_CORE_FT
Please call Dr. Mario's office to make a follow up appointment in 1-2 weeks.    Please call Dr. Rios's office to make an outpatient follow up for a colonoscopy [phone]    Please call Dr. Riley's office for elective ureteroscopy in 1-2 weeks [phone].

## 2020-07-11 NOTE — DISCHARGE NOTE PROVIDER - NSDCMRMEDTOKEN_GEN_ALL_CORE_FT
Cipro 500 mg oral tablet: 1 tab(s) orally once a day MDD:1  losartan-hydrochlorothiazide 100mg-12.5mg oral tablet: 1 tab(s) orally once a day  oxycodone-acetaminophen 5 mg-325 mg oral tablet: 1 tab(s) orally every 6 hours, As Needed for moderate to severe pain MDD:4   Pyridium 100 mg oral tablet: 2 tab(s) orally 3 times a day, As Needed  for urinary pain, (will change urine color to red/orange) MDD:6   rosuvastatin:   tamsulosin 0.4 mg oral capsule: 1 cap(s) orally once a day MDD:1

## 2020-07-11 NOTE — H&P ADULT - NSHPLABSRESULTS_GEN_ALL_CORE
LABS:                        10.2   16.74 )-----------( 305      ( 10 Jul 2020 22:00 )             31.7     07-10    141  |  101  |  16  ----------------------------<  123<H>  3.8   |  27  |  1.27    Ca    8.5      10 Jul 2020 22:00    TPro  6.4  /  Alb  4.1  /  TBili  1.1  /  DBili  x   /  AST  15  /  ALT  13  /  AlkPhos  62  07-10    PT/INR - ( 10 Jul 2020 22:00 )   PT: 13.0 sec;   INR: 1.09          PTT - ( 10 Jul 2020 22:00 )  PTT:30.1 sec  CAPILLARY BLOOD GLUCOSE        Urinalysis Basic - ( 10 Jul 2020 22:35 )    Color: Yellow / Appearance: SL Cloudy / S.025 / pH: x  Gluc: x / Ketone: NEGATIVE  / Bili: Negative / Urobili: 0.2 E.U./dL   Blood: x / Protein: 100 mg/dL / Nitrite: NEGATIVE   Leuk Esterase: Trace / RBC: Many /HPF / WBC 5-10 /HPF   Sq Epi: x / Non Sq Epi: 0-5 /HPF / Bacteria: Present /HPF      LIVER FUNCTIONS - ( 10 Jul 2020 22:00 )  Alb: 4.1 g/dL / Pro: 6.4 g/dL / ALK PHOS: 62 U/L / ALT: 13 U/L / AST: 15 U/L / GGT: x           ABO Interpretation: HUSSEIN (07-10 @ 21:51)

## 2020-07-11 NOTE — H&P ADULT - ASSESSMENT
40M w/ PHMx of nephrolithiasis, HTN, HLD, presents with BRBPR for 3 days. Patient HDS with Hgb of 10.2. CTA wet read with one area of potential blush near the hepatic flexure, will follow up final read.     admit to team 4 surgery under Dr. Mario   pain/nausea control   NPO/IVF  SCDs/OOB/ambulate/holding chemo ppx in setting of GI bleed   GI Zlatanic consulted, will see in AM   Urology Osvaldo consulted for nephrolithiasis   plan discussed with chief resident and Dr. Mario 40M w/ PHMx of nephrolithiasis, HTN, HLD, presents with BRBPR for 3 days. Patient HDS with Hgb of 10.2. WBC 16 in setting of know L kidney stone, Abdominal exam benign. CTA wet read with one area of potential blush near the hepatic flexure, will follow up final read.     admit to team 4 surgery under Dr. Mario   pain/nausea control   NPO/IVF  Cipro for possible infected stone   Trend Hgb   SCDs/OOB/ambulate/holding chemo ppx in setting of GI bleed   GI Zlatanic consulted, will see in AM   Urology Osvaldo consulted for nephrolithiasis   plan discussed with chief resident and Dr. Mario

## 2020-07-11 NOTE — DISCHARGE NOTE PROVIDER - NSDCFUADDINST_GEN_ALL_CORE_FT
General Discharge Instructions:  Please resume all regular home medications unless specifically advised not to take a particular medication. Also, please take any new medications as prescribed.    You have been prescribed oral antibiotics. Please be sure to complete the entire course as directed.    Please keep track of how much Tylenol (acetaminophen) you are taking. Do not take more than 4,000 mg per day. Be aware that Percocet and Tylenol #3 has tylenol in it.    Please get plenty of rest, continue to ambulate several times per day, and drink adequate amounts of fluids.   Avoid driving or operating heavy machinery while taking pain medications.  Please follow-up with your surgeon and Primary Care Provider (PCP) as advised.    Warning Signs:  Please call your doctor or nurse practitioner if you experience the following:  *You experience new chest pain, pressure, squeezing or tightness.  *If you are vomiting and cannot keep down fluids or your medications.  *You are getting dehydrated due to continued vomiting, diarrhea, or other reasons. Signs of dehydration include dry mouth, rapid heartbeat, or feeling dizzy or faint when standing.  *You see blood or dark/black material when you vomit or have a bowel movement.  *Call or return immediately if your pain is getting worse, changes location, or moves to your chest or back.  *You have shaking chills, or fever greater than 101.5 degrees Fahrenheit or 38 degrees Celsius.  *Any change in your symptoms, or any new symptoms that concern you.

## 2020-07-11 NOTE — PROGRESS NOTE ADULT - SUBJECTIVE AND OBJECTIVE BOX
Pt seen and examined at bedside. Has history of recurrent nephrolithiasis. Underwent left ureteral stent placement at OSH two days ago for 8mm left ureteral stone. No culture results available, unclear if infected.   No CVAT. Voiding well. Afebrile.  Currently with no pain. CT with stent in good position.   Can f/u outpt with Dr Riley for definitive stone management  Please send repeat urine culture. D/c with flomax, abx, percocet, pyridium.

## 2020-07-11 NOTE — CONSULT NOTE ADULT - SUBJECTIVE AND OBJECTIVE BOX
GASTROENTEROLOGY CONSULT NOTE  HPI:  40M w/ PHMx of nephrolithiasis, HTN, HLD, presents with BRBPR for 3 days. Patient reports that he underwent a rubber band ligation of internal hemorrhoids on 6/16. He experienced some rectal bleeding post-op but it eventually resolved on its own. 3 days ago, the patient was and work and had a large volume BRBPR that persisted over the next couple of days. Yesterday, after having a bloody BM, the patient reports that he felt lightheaded and fainted. He went to a hospital in [place] where they performed a CT which showed no active bleed. It did however show a L stone in the proximal ureter, for which he underwent cystoscopy with stent placement. He went for a colonoscopy this AM at the OSH which showed diverticulosis throughout the left colon with some residual clots but no active bleed. The patient did not believe he was receiving adequate care so he left AMA and came to [facility]. Patient reports some abdominal pain after the colonoscopy this morning (mild, generalized, intermittent) but otherwise no CP, SOB, dizziness, lightheadedness, dysuria.     PMH: HTN, HLD, nephrolithiasis   PSH: cystoscopy w/ stent   Meds: losartan/HCTZ, rosuvastatin  All: PCN as a child, doesn't know the reaction  FH: noncontributory  SH: smokes 1ppd, drinks 1 alcoholic beverage per night, occasional marijuana use (11 Jul 2020 00:29)    Allergies    penicillin (Unknown)    Intolerances      Home Medications:  losartan-hydrochlorothiazide 100mg-12.5mg oral tablet: 1 tab(s) orally once a day (11 Jul 2020 00:48)  rosuvastatin:  (11 Jul 2020 00:48)    MEDICATIONS:  MEDICATIONS  (STANDING):  ciprofloxacin   IVPB 400 milliGRAM(s) IV Intermittent every 12 hours  lactated ringers. 1000 milliLiter(s) (150 mL/Hr) IV Continuous <Continuous>    MEDICATIONS  (PRN):  ondansetron Injectable 4 milliGRAM(s) IV Push every 6 hours PRN Nausea    PAST MEDICAL & SURGICAL HISTORY:    FAMILY HISTORY:    SOCIAL HISTORY:  As above    REVIEW OF SYSTEMS:  CONSTITUTIONAL: No weakness, fevers or chills  HEENT: No visual changes; No vertigo or throat pain   NECK: No pain or stiffness  RESPIRATORY: No cough, wheezing, hemoptysis; No shortness of breath  CARDIOVASCULAR: No chest pain or palpitations  GASTROINTESTINAL: As above  GENITOURINARY: No dysuria, frequency or hematuria  NEUROLOGICAL: No numbness or weakness  SKIN: No itching, burning, rashes, or lesions   All other 10 review of systems is negative unless indicated above.    Vital Signs Last 24 Hrs  T(C): 36.7 (11 Jul 2020 06:10), Max: 36.7 (10 Jul 2020 21:37)  T(F): 98 (11 Jul 2020 06:10), Max: 98.1 (10 Jul 2020 21:37)  HR: 64 (11 Jul 2020 06:10) (61 - 82)  BP: 128/78 (11 Jul 2020 06:10) (128/78 - 153/74)  BP(mean): --  RR: 16 (11 Jul 2020 06:10) (16 - 18)  SpO2: 95% (11 Jul 2020 06:10) (95% - 100%)      PHYSICAL EXAM:    General: Well developed; well nourished; in no acute distress  Eyes: Anicteric sclerae, moist conjunctivae  HENT: Moist mucous membranes  Neck: Trachea midline, supple  Lungs: Normal respiratory effort, no intercostal retractions  Cardiovascular: RRR  Abdomen: Soft, non-tender non-distended; Normal bowel sounds; No rebound or guarding  Extremities: Normal range of motion, No clubbing, cyanosis or edema  Neurological: Alert and oriented x3  Skin: Warm and dry. No obvious rash    LABS:                        8.9    11.77 )-----------( 238      ( 11 Jul 2020 06:11 )             27.9     07-11    140  |  101  |  15  ----------------------------<  103<H>  3.3<L>   |  28  |  1.25    Ca    8.2<L>      11 Jul 2020 06:11  Phos  3.1     07-11  Mg     2.2     07-11    TPro  6.4  /  Alb  4.1  /  TBili  1.1  /  DBili  x   /  AST  15  /  ALT  13  /  AlkPhos  62  07-10      Culture Results:   No growth to date (07-11 @ 01:38)    PT/INR - ( 10 Jul 2020 22:00 )   PT: 13.0 sec;   INR: 1.09          PTT - ( 10 Jul 2020 22:00 )  PTT:30.1 sec    Culture - Urine (collected 11 Jul 2020 01:38)  Source: .Urine Clean Catch (Midstream)  Preliminary Report (11 Jul 2020 08:35):    No growth to date      RADIOLOGY & ADDITIONAL STUDIES:     Reviewed

## 2020-07-12 LAB
CULTURE RESULTS: NO GROWTH — SIGNIFICANT CHANGE UP
SPECIMEN SOURCE: SIGNIFICANT CHANGE UP

## 2020-07-15 DIAGNOSIS — D72.829 ELEVATED WHITE BLOOD CELL COUNT, UNSPECIFIED: ICD-10-CM

## 2020-07-15 DIAGNOSIS — E78.5 HYPERLIPIDEMIA, UNSPECIFIED: ICD-10-CM

## 2020-07-15 DIAGNOSIS — K64.8 OTHER HEMORRHOIDS: ICD-10-CM

## 2020-07-15 DIAGNOSIS — I10 ESSENTIAL (PRIMARY) HYPERTENSION: ICD-10-CM

## 2020-07-15 DIAGNOSIS — Z88.0 ALLERGY STATUS TO PENICILLIN: ICD-10-CM

## 2020-07-15 DIAGNOSIS — R14.0 ABDOMINAL DISTENSION (GASEOUS): ICD-10-CM

## 2020-07-15 DIAGNOSIS — N39.0 URINARY TRACT INFECTION, SITE NOT SPECIFIED: ICD-10-CM

## 2020-07-15 DIAGNOSIS — Z96.0 PRESENCE OF UROGENITAL IMPLANTS: ICD-10-CM

## 2020-07-15 DIAGNOSIS — K92.2 GASTROINTESTINAL HEMORRHAGE, UNSPECIFIED: ICD-10-CM

## 2020-07-15 DIAGNOSIS — R31.9 HEMATURIA, UNSPECIFIED: ICD-10-CM

## 2020-07-15 DIAGNOSIS — D62 ACUTE POSTHEMORRHAGIC ANEMIA: ICD-10-CM

## 2020-07-15 DIAGNOSIS — F17.210 NICOTINE DEPENDENCE, CIGARETTES, UNCOMPLICATED: ICD-10-CM

## 2020-07-16 LAB
CULTURE RESULTS: SIGNIFICANT CHANGE UP
CULTURE RESULTS: SIGNIFICANT CHANGE UP
SPECIMEN SOURCE: SIGNIFICANT CHANGE UP
SPECIMEN SOURCE: SIGNIFICANT CHANGE UP

## 2020-07-22 ENCOUNTER — OUTPATIENT (OUTPATIENT)
Dept: OUTPATIENT SERVICES | Facility: HOSPITAL | Age: 40
LOS: 1 days | Discharge: HOME | End: 2020-07-22
Payer: COMMERCIAL

## 2020-07-22 PROCEDURE — 88300 SURGICAL PATH GROSS: CPT | Mod: 26

## 2020-07-22 RX ORDER — MOXIFLOXACIN HYDROCHLORIDE TABLETS, 400 MG 400 MG/1
1 TABLET, FILM COATED ORAL
Qty: 6 | Refills: 0
Start: 2020-07-22 | End: 2020-07-24

## 2020-07-29 LAB — NIDUS STONE QN: SIGNIFICANT CHANGE UP

## 2020-07-30 LAB — SURGICAL PATHOLOGY STUDY: SIGNIFICANT CHANGE UP

## 2021-03-29 NOTE — ED PROVIDER NOTE - CARE PLAN
Patient seen doing well.tolerated or. On antibiotics perid.  possibe dc today Principal Discharge DX:	Lower GI bleed
